# Patient Record
Sex: FEMALE
[De-identification: names, ages, dates, MRNs, and addresses within clinical notes are randomized per-mention and may not be internally consistent; named-entity substitution may affect disease eponyms.]

---

## 2018-01-31 ENCOUNTER — HOSPITAL ENCOUNTER (EMERGENCY)
Dept: HOSPITAL 31 - C.ER | Age: 28
Discharge: HOME | End: 2018-01-31
Payer: COMMERCIAL

## 2018-01-31 VITALS
SYSTOLIC BLOOD PRESSURE: 136 MMHG | RESPIRATION RATE: 20 BRPM | TEMPERATURE: 99.7 F | DIASTOLIC BLOOD PRESSURE: 80 MMHG | HEART RATE: 94 BPM | OXYGEN SATURATION: 99 %

## 2018-01-31 DIAGNOSIS — J03.90: Primary | ICD-10-CM

## 2018-01-31 NOTE — C.PDOC
History Of Present Illness


26 y/o female presents to the ER complaining of fever and throat pain which has 

been present for the past 2 days. Patient denies having nausea, vomiting, and 

diarrhea. Patient does not have any other complaints.


Time Seen by Provider: 01/31/18 09:28


Chief Complaint (Nursing): Fever


History Per: Patient


History/Exam Limitations: no limitations


Onset/Duration Of Symptoms: Days


Current Symptoms Are (Timing): Still Present


Associated Symptoms: Fever, Sore Throat


Severity: Moderate





Past Medical History


Reviewed: Historical Data, Nursing Documentation, Vital Signs


Vital Signs: 


 Last Vital Signs











Temp  99.7 F H  01/31/18 09:12


 


Pulse  94 H  01/31/18 09:12


 


Resp  20   01/31/18 09:12


 


BP  136/80   01/31/18 09:12


 


Pulse Ox  99   01/31/18 09:47














- Medical History


PMH: Asthma


Surgical History: No Surg Hx


Family History: States: No Known Family Hx





- Social History


Hx Alcohol Use: Yes


Hx Substance Use: No





- Immunization History


Hx Tetanus Toxoid Vaccination: Yes


Hx Influenza Vaccination: No


Hx Pneumococcal Vaccination: No





Review Of Systems


Except As Marked, All Systems Reviewed And Found Negative.


Constitutional: Positive for: Fever


ENT: Positive for: Throat Pain


Gastrointestinal: Negative for: Nausea, Vomiting, Diarrhea





Physical Exam





- Physical Exam


Appears: Non-toxic, No Acute Distress


Skin: Normal Color, Warm


Head: Atraumatic, Normacephalic


Eye(s): bilateral: Normal Inspection, PERRL


Ear(s): Bilateral: Normal


Nose: Normal


Oral Mucosa: Moist


Throat: No Erythema, No Exudate, Other (enlarged right tonsil area, edema in 

right tonsil which comes to midline,no abscess, no hot potato voice )


Neck: Supple


Chest: Symmetrical


Cardiovascular: Rhythm Regular


Respiratory: Normal Breath Sounds, No Accessory Muscle Use, No Rales, No Rhonchi

, No Wheezing, Other (airway is patent)


Extremity: Normal ROM


Neurological/Psych: Oriented x3, Normal Speech, Normal Cognition, Normal Motor, 

Normal Sensation





ED Course And Treatment


O2 Sat by Pulse Oximetry: 99 (RA)


Pulse Ox Interpretation: Normal


Progress Note: Patient given Augmentin, Ultram, and PredniSONE.





Medical Decision Making


Medical Decision Making: 





tonsillitis vs early R peritonsillar abscess


no hot potatoe voice


airway normal


return in AM for re-eval.





Disposition


Doctor Will See Patient In The: Office


Counseled Patient/Family Regarding: Studies Performed, Diagnosis





- Disposition


Referrals: 


[facility] [Outside]


Behin,Babak, MD [Staff Provider] - 


Disposition: HOME/ ROUTINE


Disposition Time: 09:46


Condition: GOOD


Additional Instructions: 


Augmentin dos veces al hay por 7 garcia.


Prednisona 40 mg diario por 4 garcia mas- baja inflammacion





Tramadol 50 mg (narcotico) 1-2 tabletas cada 4-6 horas carina necessario





Pepcid 20 mg (baja acides del estomago) previene irritacion del estomago debido 

al Prednisona.





Regressa manana pra re-evaluacion en el [facility] con Dr. Hendrickson





Puede seguir con Dr. Behin (otolaryngologo) especialista de las amigdalas- 

LLama para hacer neil.





Regressa immediamente si tiene difficultad respirando por la garganta. 


Prescriptions: 


Amoxicillin/Clavulanate [Augmentin 875 MG-125 MG] 1 tab PO BID #13 tab


Prednisone [Deltasone] 40 mg PO DAILY #8 tablet


traMADol [Ultram] 50 mg PO Q6H PRN #20 tab


 PRN Reason: pain 


Instructions:  Peritonsillar Abscess (ED), Tonsillitis (ED)


Forms:  [facility] (English)


Print Language: Hebrew





- Clinical Impression


Clinical Impression: 


 Tonsillitis








- Scribe Statement


The provider has reviewed the documentation as recorded by the Ehsan Salgado


Provider Attestation: 





All medical record entries made by the Dashaibe were at my direction and 

personally dictated by me. I have reviewed the chart and agree that the record 

accurately reflects my personal performance of the history, physical exam, 

medical decision making, and the department course for this patient. I have 

also personally directed, reviewed, and agree with the discharge instructions 

and disposition.

## 2018-02-01 ENCOUNTER — HOSPITAL ENCOUNTER (EMERGENCY)
Dept: HOSPITAL 31 - C.ER | Age: 28
Discharge: HOME | End: 2018-02-01
Payer: COMMERCIAL

## 2018-02-01 VITALS
TEMPERATURE: 98.9 F | DIASTOLIC BLOOD PRESSURE: 81 MMHG | HEART RATE: 95 BPM | OXYGEN SATURATION: 100 % | RESPIRATION RATE: 16 BRPM | SYSTOLIC BLOOD PRESSURE: 127 MMHG

## 2018-02-01 DIAGNOSIS — J03.90: Primary | ICD-10-CM

## 2018-02-01 NOTE — C.PDOC
History Of Present Illness


26 y/o female presents to the ER to follow up for tonsillar inflammation after 

being seen yesterday in the ER. Patient states that she was prescribed Tramadol 

and Prednisone. Patient reports that she is taking her medications with good 

compliance. Patient does not have any other complaints.


Time Seen by Provider: 02/01/18 10:22


Chief Complaint (Nursing): Wound Check


History Per: Patient


History/Exam Limitations: no limitations


Onset/Duration Of Symptoms: Days Ago


Current Symptoms Are (Timing): Still Present


Severity: Moderate





Past Medical History


Reviewed: Historical Data, Nursing Documentation, Vital Signs


Vital Signs: 


 Last Vital Signs











Temp  98.9 F   02/01/18 09:30


 


Pulse  95 H  02/01/18 09:30


 


Resp  16   02/01/18 09:30


 


BP  127/81   02/01/18 09:30


 


Pulse Ox  100   02/01/18 10:39














- Medical History


PMH: Asthma


Surgical History: No Surg Hx


Family History: States: No Known Family Hx





- Social History


Hx Alcohol Use: Yes


Hx Substance Use: No





- Immunization History


Hx Tetanus Toxoid Vaccination: Yes


Hx Influenza Vaccination: No


Hx Pneumococcal Vaccination: No





Review Of Systems


Except As Marked, All Systems Reviewed And Found Negative.


Constitutional: Negative for: Fever, Chills


ENT: Positive for: Other (tonsillar inflammation)





Physical Exam





- Physical Exam


Appears: Non-toxic, No Acute Distress


Skin: Normal Color, Warm


Head: Atraumatic, Normacephalic


Eye(s): bilateral: Normal Inspection


Nose: Normal


Oral Mucosa: Moist


Throat: Other (enlarged erythematous right tonsil, not signicantly different 

from PE on Jan 31, 2018,)


Lymphatic: Adenopathy (lymphadenopathy on right side)


Extremity: Normal ROM


Neurological/Psych: Oriented x3, Normal Speech, Normal Motor, Normal Sensation





ED Course And Treatment


O2 Sat by Pulse Oximetry: 100 (RA)


Pulse Ox Interpretation: Normal





Medical Decision Making


Medical Decision Making: 





similar exam though pt much improved symptomatically


no obvious tonsillar abscess


outpatient f/u PRN





Disposition


Doctor Will See Patient In The: Office


Counseled Patient/Family Regarding: Studies Performed, Diagnosis





- Disposition


Referrals: 


Behin,Babak, MD [Staff Provider] - 


Disposition: HOME/ ROUTINE


Disposition Time: 10:25


Condition: GOOD


Additional Instructions: 


sigue mead tratamiento carina recetado hardeep


Leonora muchos liquidos


Regressa si las sintomas se empeoran.


o' regressa con el Otolaryngologo carina necessario. 


Instructions:  Tonsillitis (ED)


Forms:  [facility] (English)


Print Language: Amharic





- Clinical Impression


Clinical Impression: 


 Tonsillitis








- Scribe Statement


The provider has reviewed the documentation as recorded by the Scribe


Patricia Salgado


Provider Attestation: 





All medical record entries made by the Scribe were at my direction and 

personally dictated by me. I have reviewed the chart and agree that the record 

accurately reflects my personal performance of the history, physical exam, 

medical decision making, and the department course for this patient. I have 

also personally directed, reviewed, and agree with the discharge instructions 

and disposition.

## 2018-02-02 ENCOUNTER — HOSPITAL ENCOUNTER (OUTPATIENT)
Dept: HOSPITAL 31 - C.ER | Age: 28
Setting detail: OBSERVATION
LOS: 1 days | Discharge: HOME | End: 2018-02-03
Attending: FAMILY MEDICINE | Admitting: FAMILY MEDICINE
Payer: COMMERCIAL

## 2018-02-02 VITALS — TEMPERATURE: 98.1 F

## 2018-02-02 DIAGNOSIS — J36: Primary | ICD-10-CM

## 2018-02-02 DIAGNOSIS — Z23: ICD-10-CM

## 2018-02-02 DIAGNOSIS — J45.909: ICD-10-CM

## 2018-02-02 LAB
BASOPHILS # BLD AUTO: 0 K/UL (ref 0–0.2)
BASOPHILS NFR BLD: 0.2 % (ref 0–2)
BUN SERPL-MCNC: 8 MG/DL (ref 7–17)
CALCIUM SERPL-MCNC: 9.4 MG/DL (ref 8.6–10.4)
EOSINOPHIL # BLD AUTO: 0 K/UL (ref 0–0.7)
EOSINOPHIL NFR BLD: 0.2 % (ref 0–4)
ERYTHROCYTE [DISTWIDTH] IN BLOOD BY AUTOMATED COUNT: 13.1 % (ref 11.5–14.5)
GFR NON-AFRICAN AMERICAN: > 60
HGB BLD-MCNC: 13.9 G/DL (ref 11–16)
LYMPHOCYTES # BLD AUTO: 1.7 K/UL (ref 1–4.3)
LYMPHOCYTES NFR BLD AUTO: 21.4 % (ref 20–40)
MCH RBC QN AUTO: 31.4 PG (ref 27–31)
MCHC RBC AUTO-ENTMCNC: 35 G/DL (ref 33–37)
MCV RBC AUTO: 89.7 FL (ref 81–99)
MONOCYTES # BLD: 0.7 K/UL (ref 0–0.8)
MONOCYTES NFR BLD: 8.1 % (ref 0–10)
NEUTROPHILS # BLD: 5.7 K/UL (ref 1.8–7)
NEUTROPHILS NFR BLD AUTO: 70.1 % (ref 50–75)
NRBC BLD AUTO-RTO: 0 % (ref 0–2)
PLATELET # BLD: 213 K/UL (ref 130–400)
PMV BLD AUTO: 9.1 FL (ref 7.2–11.7)
RBC # BLD AUTO: 4.42 MIL/UL (ref 3.8–5.2)
WBC # BLD AUTO: 8.2 K/UL (ref 4.8–10.8)

## 2018-02-02 PROCEDURE — 71045 X-RAY EXAM CHEST 1 VIEW: CPT

## 2018-02-02 PROCEDURE — 80053 COMPREHEN METABOLIC PANEL: CPT

## 2018-02-02 PROCEDURE — 96365 THER/PROPH/DIAG IV INF INIT: CPT

## 2018-02-02 PROCEDURE — 84703 CHORIONIC GONADOTROPIN ASSAY: CPT

## 2018-02-02 PROCEDURE — 96361 HYDRATE IV INFUSION ADD-ON: CPT

## 2018-02-02 PROCEDURE — 87040 BLOOD CULTURE FOR BACTERIA: CPT

## 2018-02-02 PROCEDURE — 85730 THROMBOPLASTIN TIME PARTIAL: CPT

## 2018-02-02 PROCEDURE — 80048 BASIC METABOLIC PNL TOTAL CA: CPT

## 2018-02-02 PROCEDURE — 83735 ASSAY OF MAGNESIUM: CPT

## 2018-02-02 PROCEDURE — 85025 COMPLETE CBC W/AUTO DIFF WBC: CPT

## 2018-02-02 PROCEDURE — 90674 CCIIV4 VAC NO PRSV 0.5 ML IM: CPT

## 2018-02-02 PROCEDURE — 96366 THER/PROPH/DIAG IV INF ADDON: CPT

## 2018-02-02 PROCEDURE — 90732 PPSV23 VACC 2 YRS+ SUBQ/IM: CPT

## 2018-02-02 PROCEDURE — 36415 COLL VENOUS BLD VENIPUNCTURE: CPT

## 2018-02-02 PROCEDURE — 84100 ASSAY OF PHOSPHORUS: CPT

## 2018-02-02 PROCEDURE — 96375 TX/PRO/DX INJ NEW DRUG ADDON: CPT

## 2018-02-02 PROCEDURE — 99284 EMERGENCY DEPT VISIT MOD MDM: CPT

## 2018-02-02 PROCEDURE — 85610 PROTHROMBIN TIME: CPT

## 2018-02-02 PROCEDURE — 70491 CT SOFT TISSUE NECK W/DYE: CPT

## 2018-02-02 NOTE — CT
EXAM:

  CT Neck With Intravenous Contrast



CLINICAL HISTORY:

  27 years old, female; Signs and symptoms; Mass, lump, or swelling in neck; 

Additional info: Right peritonsillar swelling R/O abscess



TECHNIQUE:

  Axial computed tomography images of the neck with intravenous contrast.  All 

CT scans at this facility use one or more dose reduction techniques, viz.: 

automated exposure control; ma/kV adjustment per patient size (including 

targeted exams where dose is matched to indication; i.e. head); or iterative 

reconstruction technique.

  Coronal and sagittal reformatted images were created and reviewed.



CONTRAST:

  100 mL of visipaque 320 administered intravenously.



COMPARISON:

  No relevant prior studies available.



FINDINGS:

  Nasopharynx:  Unremarkable.

  Oropharynx:  Enlargement of palatine tonsils, right greater than left.  2.6 x 

1.5 by 1.8 cm peripherally enhancing fluid collection within right 

peritonsillar region.  Mild stranding/edema within right parapharyngeal space.

  Hypopharynx:  Mild mucosal thickening of right hypopharynx.

  Larynx:  Unremarkable.  Normal epiglottis.

  Trachea:  Unremarkable.

  Retropharyngeal space:  Unremarkable.

  Submandibular/parotid glands:  Unremarkable.  Glands are normal in size.

  Thyroid:  Unremarkable.  No enlarged or calcified nodules.

  Bones/joints:  No acute fracture.

  Soft tissues:  Unremarkable.

  Vasculature:  No acute findings.

  Lymph nodes:  Few mildly enlarged right cervical lymph nodes.

  Sinuses:  Scattered minimal mucosal thickening.  No air-fluid levels.

  Mastoid air cells:  No mastoid effusion.

  Lung apices:  Unremarkable as visualized.



IMPRESSION:     

1.  Tonsillitis/pharyngitis with right peritonsillar abscess.

2.  Incidental/non-acute findings are described above.

## 2018-02-02 NOTE — CP.PCM.HP
<AhmadiMarcy pérezBritney - Last Filed: 02/03/18 02:36>





History of Present Illness





- History of Present Illness


History of Present Illness: 





CC: " Sore throat"





HPI: 27 year old female with past medical history of asthma comes to the ED for 

a sore throat.  She states it started on Monday and she came to the ED and they 

sent her home with Augmentin for 7 days.  She came back on Wednesday and they 

again sent her home to continue with the antibiotics and medication as needed 

for pain.  She states today she was not able to swallow her throat hurt so 

badly.  She was not able to eat or drink anything today.  She describes the 

pain as pulsating about a 9-10/10.  She states she has had fever and chills.  

She denies nausea, vomiting, diarrhea or constipation.  





PMD: None


Past Medical history: Asthma


Past Surgical history: denies


Medications: Albuterol


Allergies: NKDA


Social History: denies smoking, drinks alcohol occasionally; denies illicit 

drug use; lives with a friend at the moment; worked previously at a flower 

shop. 





Present on Admission





- Present on Admission


Any Indicators Present on Admission: No





Review of Systems





- Constitutional


Constitutional: Chills, Fever





- EENT


Eyes: absent: Change in Vision


Ears: absent: Dizziness


Nose/Mouth/Throat: Sore Throat.  absent: Nasal Congestion





- Cardiovascular


Cardiovascular: absent: Chest Pain, Dyspnea





- Respiratory


Respiratory: absent: Cough, Dyspnea





- Gastrointestinal


Gastrointestinal: absent: Constipation, Diarrhea, Nausea, Vomiting





- Genitourinary


Genitourinary: absent: Dysuria





- Neurological


Neurological: absent: Dizziness, Headaches





- Endocrine


Endocrine: absent: Fatigue, Palpitations





Past Patient History





- Infectious Disease


Hx of Infectious Diseases: None





- Past Social History


Smoking Status: Never Smoked





- PULMONARY


Hx Asthma: Yes





- PSYCHIATRIC


Hx Substance Use: No





- SURGICAL HISTORY


Hx Surgeries: No





- ANESTHESIA


Hx Anesthesia: Yes


Hx Anesthesia Reactions: No





Meds


Allergies/Adverse Reactions: 


 Allergies











Allergy/AdvReac Type Severity Reaction Status Date / Time


 


No Known Allergies Allergy   Verified 02/02/18 19:00














Physical Exam





- Constitutional


Appears: No Acute Distress





- Head Exam


Head Exam: ATRAUMATIC, NORMAL INSPECTION





- Eye Exam


Eye Exam: EOMI, Normal appearance





- ENT Exam


ENT Exam: Mucous Membranes Moist, Normal External Ear Exam, TM's Normal 

Bilaterally.  absent: Normal Exam, Normal Oropharynx (left tonsil enlarged and 

red; no exudate seen)





- Neck Exam


Neck exam: Positive for: Lymphadenopathy (cervical lymphadenopathy )





- Respiratory Exam


Respiratory Exam: Clear to Auscultation Bilateral, NORMAL BREATHING PATTERN.  

absent: Rales, Rhonchi, Wheezes





- Cardiovascular Exam


Cardiovascular Exam: REGULAR RHYTHM, RRR, +S1, +S2





- GI/Abdominal Exam


GI & Abdominal Exam: Normal Bowel Sounds, Soft.  absent: Tenderness





- Extremities Exam


Extremities exam: Positive for: normal inspection





- Neurological Exam


Neurological exam: Alert, Oriented x3





- Psychiatric Exam


Psychiatric exam: Normal Affect, Normal Mood





- Skin


Skin Exam: Normal Color, Warm





Results





- Vital Signs


Recent Vital Signs: 





 Last Vital Signs











Temp  98.1 F   02/02/18 22:14


 


Pulse  77   02/02/18 22:14


 


Resp  18   02/02/18 22:14


 


BP  100/61   02/02/18 22:14


 


Pulse Ox  98   02/02/18 22:14














- Labs


Result Diagrams: 


 02/02/18 19:35





 02/02/18 19:35


Labs: 





 Laboratory Results - last 24 hr











  02/02/18 02/02/18





  19:35 19:35


 


WBC  8.2 


 


RBC  4.42 


 


Hgb  13.9 


 


Hct  39.6 


 


MCV  89.7 


 


MCH  31.4 H 


 


MCHC  35.0 


 


RDW  13.1 


 


Plt Count  213 


 


MPV  9.1 


 


Neut % (Auto)  70.1 


 


Lymph % (Auto)  21.4 


 


Mono % (Auto)  8.1 


 


Eos % (Auto)  0.2 


 


Baso % (Auto)  0.2 


 


Neut # (Auto)  5.7 


 


Lymph # (Auto)  1.7 


 


Mono # (Auto)  0.7 


 


Eos # (Auto)  0.0 


 


Baso # (Auto)  0.0 


 


Sodium   137


 


Potassium   3.5 L


 


Chloride   95 L


 


Carbon Dioxide   31 H


 


Anion Gap   14


 


BUN   8


 


Creatinine   0.6 L


 


Est GFR ( Amer)   > 60


 


Est GFR (Non-Af Amer)   > 60


 


Random Glucose   109 H


 


Calcium   9.4














Assessment & Plan





- Assessment and Plan (Free Text)


Plan: 





1.) Right Peritonsillar abscess


- Soft Tissue Neck CT:  Tonsillitis/pharyngitis with right peritonsillar 

abscess. 


- ENT Consult: Dr. Behin --> help appreciated


- Clindamycin 600mg q6h


- Decadron 4mg q12h 


- Toradol 30mg q6 prn for pain


- NS @100cc/hr


- NPO


- Pre-op Clearance if needed


   - f/u EKG


   - f/u PT/INR


   - f/u chest xray





2.) History of Asthma


- Duonebs q6 prn





3.) Prophylaxis 


- scds


- protonix 40mg daily





Case discussed with Dr. Rosalva Ahmadi PGY-1  





<Dangelo Blackwell - Last Filed: 02/03/18 06:18>





Results





- Vital Signs


Recent Vital Signs: 





 Last Vital Signs











Temp  98.1 F   02/03/18 00:34


 


Pulse  68   02/03/18 00:34


 


Resp  20   02/03/18 00:34


 


BP  103/67   02/03/18 00:34


 


Pulse Ox  99   02/03/18 00:34














- Labs


Result Diagrams: 


 02/02/18 19:35





 02/02/18 19:35


Labs: 





 Laboratory Results - last 24 hr











  02/02/18 02/02/18





  19:35 19:35


 


WBC  8.2 


 


RBC  4.42 


 


Hgb  13.9 


 


Hct  39.6 


 


MCV  89.7 


 


MCH  31.4 H 


 


MCHC  35.0 


 


RDW  13.1 


 


Plt Count  213 


 


MPV  9.1 


 


Neut % (Auto)  70.1 


 


Lymph % (Auto)  21.4 


 


Mono % (Auto)  8.1 


 


Eos % (Auto)  0.2 


 


Baso % (Auto)  0.2 


 


Neut # (Auto)  5.7 


 


Lymph # (Auto)  1.7 


 


Mono # (Auto)  0.7 


 


Eos # (Auto)  0.0 


 


Baso # (Auto)  0.0 


 


Sodium   137


 


Potassium   3.5 L


 


Chloride   95 L


 


Carbon Dioxide   31 H


 


Anion Gap   14


 


BUN   8


 


Creatinine   0.6 L


 


Est GFR ( Amer)   > 60


 


Est GFR (Non-Af Amer)   > 60


 


Random Glucose   109 H


 


Calcium   9.4














Assessment & Plan





- Date & Time


Date: 02/03/18 (I have seen and examined the patient.  I agree with the 

findings and plan of care as documented by Dr. Ahmadi.  Patient with 

peritonsillar abscess.  Consult to ENT.  Clinda, Decadron, symptomatic treatment

, NPO.  Medically optimize prior to any procedure to be done.  Monitor for 

acute changes.)


Time: 06:17





Attending/Attestation





- Attestation


I have personally seen and examined this patient.: Yes


I have fully participated in the care of the patient.: Yes


I have reviewed all pertinent clinical information: Yes

## 2018-02-03 VITALS — SYSTOLIC BLOOD PRESSURE: 100 MMHG | OXYGEN SATURATION: 97 % | DIASTOLIC BLOOD PRESSURE: 62 MMHG | HEART RATE: 67 BPM

## 2018-02-03 VITALS — RESPIRATION RATE: 20 BRPM

## 2018-02-03 LAB
ALBUMIN SERPL-MCNC: 3.9 G/DL (ref 3.5–5)
ALBUMIN/GLOB SERPL: 1.1 {RATIO} (ref 1–2.1)
ALT SERPL-CCNC: 20 U/L (ref 9–52)
APTT BLD: 31 SECONDS (ref 21–34)
AST SERPL-CCNC: 20 U/L (ref 14–36)
BASOPHILS # BLD AUTO: 0 K/UL (ref 0–0.2)
BASOPHILS NFR BLD: 0.1 % (ref 0–2)
BUN SERPL-MCNC: 11 MG/DL (ref 7–17)
CALCIUM SERPL-MCNC: 9.2 MG/DL (ref 8.6–10.4)
EOSINOPHIL # BLD AUTO: 0 K/UL (ref 0–0.7)
EOSINOPHIL NFR BLD: 0 % (ref 0–4)
ERYTHROCYTE [DISTWIDTH] IN BLOOD BY AUTOMATED COUNT: 12.9 % (ref 11.5–14.5)
GFR NON-AFRICAN AMERICAN: > 60
HGB BLD-MCNC: 12.8 G/DL (ref 11–16)
INR PPP: 1.3
LYMPHOCYTES # BLD AUTO: 0.8 K/UL (ref 1–4.3)
LYMPHOCYTES NFR BLD AUTO: 15.3 % (ref 20–40)
MAGNESIUM SERPL-MCNC: 1.8 MG/DL (ref 1.6–2.3)
MCH RBC QN AUTO: 32.1 PG (ref 27–31)
MCHC RBC AUTO-ENTMCNC: 35.1 G/DL (ref 33–37)
MCV RBC AUTO: 91.4 FL (ref 81–99)
MONOCYTES # BLD: 0.1 K/UL (ref 0–0.8)
MONOCYTES NFR BLD: 1.5 % (ref 0–10)
NEUTROPHILS # BLD: 4.3 K/UL (ref 1.8–7)
NEUTROPHILS NFR BLD AUTO: 83.1 % (ref 50–75)
NRBC BLD AUTO-RTO: 0 % (ref 0–2)
PLATELET # BLD: 208 K/UL (ref 130–400)
PMV BLD AUTO: 10.1 FL (ref 7.2–11.7)
PROTHROMBIN TIME: 14.7 SECONDS (ref 9.7–12.2)
RBC # BLD AUTO: 3.99 MIL/UL (ref 3.8–5.2)
WBC # BLD AUTO: 5.2 K/UL (ref 4.8–10.8)

## 2018-02-03 NOTE — CP.PCM.DIS
Provider





- Provider


Date of Admission: 


02/02/18 23:13





Attending physician: 


Dangelo Blackwell MD





Primary care physician: 





none


Consults: 





Dr. Behin - ENT


Time Spent in preparation of Discharge (in minutes): 35





Diagnosis





- Discharge Diagnosis


(1) Peritonsillar abscess


Status: Acute   





Hospital Course





- Lab Results


Lab Results: 


 Most Recent Lab Values











WBC  5.2 K/uL (4.8-10.8)   02/03/18  06:39    


 


RBC  3.99 Mil/uL (3.80-5.20)   02/03/18  06:39    


 


Hgb  12.8 g/dL (11.0-16.0)   02/03/18  06:39    


 


Hct  36.5 % (34.0-47.0)   02/03/18  06:39    


 


MCV  91.4 fL (81.0-99.0)   02/03/18  06:39    


 


MCH  32.1 pg (27.0-31.0)  H  02/03/18  06:39    


 


MCHC  35.1 g/dL (33.0-37.0)   02/03/18  06:39    


 


RDW  12.9 % (11.5-14.5)   02/03/18  06:39    


 


Plt Count  208 K/uL (130-400)   02/03/18  06:39    


 


MPV  10.1 fL (7.2-11.7)   02/03/18  06:39    


 


Neut % (Auto)  83.1 % (50.0-75.0)  H  02/03/18  06:39    


 


Lymph % (Auto)  15.3 % (20.0-40.0)  L  02/03/18  06:39    


 


Mono % (Auto)  1.5 % (0.0-10.0)   02/03/18  06:39    


 


Eos % (Auto)  0.0 % (0.0-4.0)   02/03/18  06:39    


 


Baso % (Auto)  0.1 % (0.0-2.0)   02/03/18  06:39    


 


Neut # (Auto)  4.3 K/uL (1.8-7.0)   02/03/18  06:39    


 


Lymph # (Auto)  0.8 K/uL (1.0-4.3)  L  02/03/18  06:39    


 


Mono # (Auto)  0.1 K/uL (0.0-0.8)   02/03/18  06:39    


 


Eos # (Auto)  0.0 K/uL (0.0-0.7)   02/03/18  06:39    


 


Baso # (Auto)  0.0 K/uL (0.0-0.2)   02/03/18  06:39    


 


PT  14.7 SECONDS (9.7-12.2)  H  02/03/18  06:39    


 


INR  1.3   02/03/18  06:39    


 


APTT  31 SECONDS (21-34)   02/03/18  06:39    


 


Sodium  137 mmol/L (132-148)   02/03/18  06:39    


 


Potassium  4.4 mmol/L (3.6-5.2)   02/03/18  06:39    


 


Chloride  101 mmol/L ()   02/03/18  06:39    


 


Carbon Dioxide  26 mmol/L (22-30)   02/03/18  06:39    


 


Anion Gap  15  (10-20)   02/03/18  06:39    


 


BUN  11 mg/dL (7-17)   02/03/18  06:39    


 


Creatinine  0.6 mg/dL (0.7-1.2)  L  02/03/18  06:39    


 


Est GFR ( Amer)  > 60   02/03/18  06:39    


 


Est GFR (Non-Af Amer)  > 60   02/03/18  06:39    


 


Random Glucose  125 mg/dL ()  H  02/03/18  06:39    


 


Calcium  9.2 mg/dl (8.6-10.4)   02/03/18  06:39    


 


Phosphorus  3.9 mg/dL (2.5-4.5)   02/03/18  06:39    


 


Magnesium  1.8 mg/dL (1.6-2.3)   02/03/18  06:39    


 


Total Bilirubin  0.6 mg/dL (0.2-1.3)   02/03/18  06:39    


 


AST  20 U/L (14-36)   02/03/18  06:39    


 


ALT  20 U/L (9-52)   02/03/18  06:39    


 


Alkaline Phosphatase  76 U/L ()   02/03/18  06:39    


 


Total Protein  7.5 g/dL (6.3-8.3)   02/03/18  06:39    


 


Albumin  3.9 g/dL (3.5-5.0)   02/03/18  06:39    


 


Globulin  3.7 gm/dL (2.2-3.9)   02/03/18  06:39    


 


Albumin/Globulin Ratio  1.1  (1.0-2.1)   02/03/18  06:39    


 


Urine HCG, Qual  Negative  (NEGATIVE)   02/03/18  09:16    














- Hospital Course


Hospital Course: 





PMD: None


Past Medical history: Asthma


Past Surgical history: denies


Medications: Albuterol


Allergies: NKDA


Social History: denies smoking, drinks alcohol occasionally; denies illicit 

drug use; lives with a friend at the moment; worked previously at a flower shop.








On admission: 27 year old female with past medical history of asthma comes to 

the ED for a sore throat.  She states it started on Monday and she came to the 

ED and they sent her home with Augmentin for 7 days.  She came back on 

Wednesday and they again sent her home to continue with the antibiotics and 

medication as needed for pain.  She states today she was not able to swallow 

her throat hurt so badly.  She was not able to eat or drink anything today.  

She describes the pain as pulsating about a 9-10/10.  She states she has had 

fever and chills.  She denies nausea, vomiting, diarrhea or constipation. 





During hospital stay: Soft Tissue Neck CT showed Tonsillitis/pharyngitis with 

right peritonsillar abscess. Dr. Behin was consulted and performed and I/D of 

this abscess on 2/3. Patient was given toradol for pain control. She was given 

Clindamycin during her stay. She was also given decadron to reduce swelling. 


 


Patient is stable for discharge home today. She is to follow up with her 

primary care within one week of discharge for post hospital care. She is also 

to call and make an appointment to follow up with Dr. Behin for the abscess in 

her throat within one week. She is to continue taking the Augmentin (antibiotic

) for 7 more days. She has 3 days left and we will write for 4 more days which 

she will need to  from the pharmacy. She is also to take a probiotic 

along with the antibiotic which she can buy over the counter. Patient is to 

continue liquid diet today then switch to softer foods tomorrow. She is to 

return to the ER if she can't tolerate oral intake, or if she develops fever/

chills. All instructions explained to the patient and she agrees. 





Discharge Exam





- Head Exam


Head Exam: ATRAUMATIC, NORMAL INSPECTION





- Eye Exam


Eye Exam: EOMI


Pupil Exam: NORMAL ACCOMODATION





- ENT Exam


Additional comments: 





tenderness R neck region





- Respiratory Exam


Respiratory Exam: Clear to PA & Lateral, NORMAL BREATHING PATTERN.  absent: 

Respiratory Distress





- Cardiovascular Exam


Cardiovascular Exam: REGULAR RHYTHM, +S1, +S2





- GI/Abdominal Exam


GI & Abdominal Exam: Normal Bowel Sounds, Soft.  absent: Distended, Firm, 

Guarding





- Extremities Exam


Extremities exam: normal inspection





- Back Exam


Back exam: NORMAL INSPECTION





- Neurological Exam


Neurological exam: Alert, CN II-XII Intact, Normal Gait, Oriented x3





- Psychiatric Exam


Psychiatric exam: Normal Affect, Normal Mood





- Skin


Skin Exam: Dry, Intact, Normal Color





Discharge Plan





- Discharge Medications


Prescriptions: 


Amoxicillin/Clavulanate [Augmentin 875 MG-125 MG Tab] 1 tab PO BID #8 tab





- Follow Up Plan


Condition: GOOD


Disposition: HOME/ ROUTINE


Instructions:  Amoxicillin/Clavulanate Potassium (By mouth), Abscess (GEN), 

Incision and Drainage (DC)


Additional Instructions: 


Patient is stable for discharge home. She is to follow up with her primary care 

within one week of discharge for post hospital care. She is also to call and 

make an appointment to follow up with Dr. Behin for the abscess in her throat 

within one week. She is to continue taking the Augmentin (antibiotic) for 7 

more days. She has 3 days left and we will write for 4 more days which she will 

need to  from the pharmacy. She is also to take a probiotic along with 

the antibiotic which she can buy over the counter. Patient is to continue 

liquid diet today then switch to softer foods tomorrow. She is to return to the 

ER if she can't tolerate oral intake, or if she develops fever/chills. All 

instructions explained to the patient and she agrees. 








El paciente est estable para el [place]ott domiciliaria. Abby debe realizar un 

seguimiento con mead atencin primaria dentro de belinda semana del [place] para recibir 

atencin hospitalaria posterior. Abby tambin debe llamar y hacer belinda neil para 

hacer un seguimiento con el Dr. Behin para el absceso en la garganta dentro de 

belinda semana. Abby debe continuar tomando Augmentin (antibitico) por 7 alvarado ms. 

Le quedan 3 alvarado y le escribiremos kai 4 alvarado ms, que deber retirar de la 

farmacia. Abby tambin debe faby un probitico junto con el antibitico que 

puede comprar sin receta mdica. El paciente debe continuar la dieta lquida 

hoy y luego cambiar a alimentos ms blandos maana. Abby debe regresar a la 

caryn de emergencias si no puede tolerar la ingesta oral o si desarrolla fiebre 

/ escalofros. Todas las instrucciones se explican a la paciente y abby est de 

acuerdo.


Referrals: 


[facility] [Outside]


Behin,Babak, MD [Staff Provider] -

## 2018-02-03 NOTE — OP
PROCEDURE DATE:  02/03/2018



PREOPERATIVE DIAGNOSIS:  Right peritonsillar abscess.



POSTOPERATIVE DIAGNOSIS:  Right peritonsillar abscess.



PROCEDURE:  Incision and drainage of right peritonsillar abscess.



SIGNIFICANT FINDINGS:  Right peritonsillar abscess.



DESCRIPTION OF PROCEDURE:  The patient was placed in the supine position. 

The right peritonsillar area was injected with lidocaine with epinephrine. 

A #11 blade was used to make an incision in the right peritonsillar area. 

Pus was noted in the right peritonsillar area.  _____ dissection was done,

loculations were broken.  Bleeding was controlled with time.  The patient

tolerated the procedure well.







__________________________________________

Babak Behin, MD





DD:  02/03/2018 8:43:29

DT:  02/03/2018 11:07:53

Job # [id number]

## 2018-02-03 NOTE — RAD
HISTORY:

pre op  



COMPARISON:

No prior. 



FINDINGS:



LUNGS:

No focal consolidation. The bronchovascular markings appear increased 

which may in part be due to due to semi-erect patient positioning and 

possibly diminished lung volumes.



PLEURA:

No significant pleural effusion identified, no pneumothorax apparent.



CARDIOVASCULAR:

Normal.



OSSEOUS STRUCTURES:

No significant abnormalities.



VISUALIZED UPPER ABDOMEN:

Normal.



OTHER FINDINGS:

None.



IMPRESSION:





No focal consolidation. The bronchovascular markings appear increased 

which may in part be due to due to semi-erect patient positioning and 

possibly diminished lung volumes.

## 2018-12-01 ENCOUNTER — HOSPITAL ENCOUNTER (EMERGENCY)
Dept: HOSPITAL 31 - C.ER | Age: 28
Discharge: HOME | End: 2018-12-01
Payer: COMMERCIAL

## 2018-12-01 VITALS
DIASTOLIC BLOOD PRESSURE: 70 MMHG | RESPIRATION RATE: 14 BRPM | HEART RATE: 90 BPM | SYSTOLIC BLOOD PRESSURE: 110 MMHG | TEMPERATURE: 98.7 F

## 2018-12-01 VITALS — OXYGEN SATURATION: 99 %

## 2018-12-01 DIAGNOSIS — O20.0: Primary | ICD-10-CM

## 2018-12-01 DIAGNOSIS — Z3A.01: ICD-10-CM

## 2018-12-01 LAB
ALBUMIN SERPL-MCNC: 4.5 [, G/DL] (ref 3.5–5)
ALBUMIN/GLOB SERPL: 1.5 [,] (ref 1–2.1)
ALT SERPL-CCNC: 50 [, U/L] (ref 9–52)
APTT BLD: 34 [, SECONDS] (ref 21–34)
AST SERPL-CCNC: 35 [, U/L] (ref 14–36)
BASOPHILS # BLD AUTO: 0 [, K/UL] (ref 0–0.2)
BASOPHILS NFR BLD: 0.2 [, %] (ref 0–2)
BUN SERPL-MCNC: 5 [, MG/DL] (ref 7–17)
CALCIUM SERPL-MCNC: 9.3 [, MG/DL] (ref 8.6–10.4)
EOSINOPHIL # BLD AUTO: 0.5 [, K/UL] (ref 0–0.7)
EOSINOPHIL NFR BLD: 6.6 [, %] (ref 0–4)
ERYTHROCYTE [DISTWIDTH] IN BLOOD BY AUTOMATED COUNT: 13.4 [, %] (ref 11.5–14.5)
GFR NON-AFRICAN AMERICAN: > 60 [,]
HGB BLD-MCNC: 12.6 [, G/DL] (ref 11–16)
INR PPP: 1.1 [,]
LYMPHOCYTES # BLD AUTO: 2.3 [, K/UL] (ref 1–4.3)
LYMPHOCYTES NFR BLD AUTO: 33.8 [, %] (ref 20–40)
MCH RBC QN AUTO: 29.9 [, PG] (ref 27–31)
MCHC RBC AUTO-ENTMCNC: 33.5 [, G/DL] (ref 33–37)
MCV RBC AUTO: 89.2 [, FL] (ref 81–99)
MONOCYTES # BLD: 0.5 [, K/UL] (ref 0–0.8)
MONOCYTES NFR BLD: 7.8 [, %] (ref 0–10)
NEUTROPHILS # BLD: 3.6 [, K/UL] (ref 1.8–7)
NEUTROPHILS NFR BLD AUTO: 51.6 [, %] (ref 50–75)
NRBC BLD AUTO-RTO: 0.1 [, %] (ref 0–2)
PLATELET # BLD: 175 [, K/UL] (ref 130–400)
PMV BLD AUTO: 9.6 [, FL] (ref 7.2–11.7)
PROTHROMBIN TIME: 11.8 [, SECONDS] (ref 9.7–12.2)
RBC # BLD AUTO: 4.23 [, MIL/UL] (ref 3.8–5.2)
WBC # BLD AUTO: 6.9 [, K/UL] (ref 4.8–10.8)

## 2018-12-01 PROCEDURE — 85025 COMPLETE CBC W/AUTO DIFF WBC: CPT

## 2018-12-01 PROCEDURE — 86850 RBC ANTIBODY SCREEN: CPT

## 2018-12-01 PROCEDURE — 84703 CHORIONIC GONADOTROPIN ASSAY: CPT

## 2018-12-01 PROCEDURE — 76805 OB US >/= 14 WKS SNGL FETUS: CPT

## 2018-12-01 PROCEDURE — 85610 PROTHROMBIN TIME: CPT

## 2018-12-01 PROCEDURE — 76705 ECHO EXAM OF ABDOMEN: CPT

## 2018-12-01 PROCEDURE — 99284 EMERGENCY DEPT VISIT MOD MDM: CPT

## 2018-12-01 PROCEDURE — 86900 BLOOD TYPING SEROLOGIC ABO: CPT

## 2018-12-01 PROCEDURE — 80053 COMPREHEN METABOLIC PANEL: CPT

## 2018-12-01 PROCEDURE — 85730 THROMBOPLASTIN TIME PARTIAL: CPT

## 2018-12-01 PROCEDURE — 76817 TRANSVAGINAL US OBSTETRIC: CPT

## 2018-12-01 PROCEDURE — 84702 CHORIONIC GONADOTROPIN TEST: CPT

## 2018-12-01 NOTE — C.PDOC
History Of Present Illness


28 year old female presents to the ED c/o hypogastric abd vaginal bleeding that 

started today. Patient states her LMP was on September, patient reports she is 

pregnant. Patient also c/o mild headache. Patient denies fever, chills, nausea, 

vomit, diarrhea, rash, back pain, dysuria, hematuria, weakness, numbness.


Chief Complaint (Nursing): Female Genitourinary


History Per: Patient


History/Exam Limitations: no limitations


Onset/Duration Of Symptoms: Hrs


Current Symptoms Are (Timing): Still Present


Quality Of Discomfort: "Pain"


Associated Symptoms: denies: Nausea, Vomiting, Diarrhea, Urinary Symptoms


Recent travel outside of the [place]: No


Additional History Per: Patient


Abnormal Vaginal Bleeding: Yes


Last Menstral Period: September





Past Medical History


Reviewed: Historical Data, Nursing Documentation, Vital Signs


Vital Signs: 





                                Last Vital Signs











Temp  98.2 F   18 20:21


 


Pulse  85   18 20:21


 


Resp  16   18 20:21


 


BP  124/79   18 20:21


 


Pulse Ox  99   18 20:21














- Medical History


PMH: Asthma


   Denies: Chronic Kidney Disease


Surgical History: No Surg Hx


Family History: States: Unknown Family Hx





- Social History


Hx Tobacco Use: No


Hx Alcohol Use: No


Hx Substance Use: No





- Immunization History


Hx Tetanus Toxoid Vaccination: Yes


Hx Influenza Vaccination: No


Hx Pneumococcal Vaccination: No





Review Of Systems


Constitutional: Negative for: Fever, Chills


Cardiovascular: Negative for: Chest Pain


Respiratory: Negative for: Cough, Shortness of Breath


Gastrointestinal: Positive for: Abdominal Pain.  Negative for: Nausea, Vomiting,

Diarrhea


Genitourinary: Positive for: Vaginal Bleeding.  Negative for: Dysuria, Vaginal 

Discharge


Musculoskeletal: Negative for: Back Pain


Neurological: Positive for: Headache.  Negative for: Weakness, Numbness, 

Dizziness





Physical Exam





- Physical Exam


Appears: Non-toxic, No Acute Distress


Skin: Normal Color, Warm, Dry


Head: Atraumatic, Normacephalic


Eye(s): bilateral: Normal Inspection


Neck: Normal ROM, Supple


Chest: Symmetrical


Cardiovascular: Rhythm Regular


Respiratory: Normal Breath Sounds, No Rales, No Rhonchi, No Wheezing


Gastrointestinal/Abdominal: Soft, Tenderness (mild hypogastric, RLQ), No 

Guarding, No Rebound


Pelvic: No Cervical Motion Tenderness, No Cervix Open (closed), No Adnexal 

Tenderness, Other (minimal bllod in the vault, no active bleeding)


Extremity: Normal ROM, No Tenderness, No Swelling


Neurological/Psych: Oriented x3, Normal Speech, Normal Cognition


Gait: Steady





ED Course And Treatment





- Laboratory Results


Result Diagrams: 


                                 18 20:54





                                 18 20:54


O2 Sat by Pulse Oximetry: 99 (ON RA)


Pulse Ox Interpretation: Normal





- CT Scan/US


  ** Abdomen US


Other Rad Studies (CT/US): Read By Radiologist, Radiology Report Reviewed


CT/US Interpretation: EXAM:  US Abdomen, Right lower Quadrant.  CLINICAL 

HISTORY:  Rlq tenderness.  TECHNIQUE:  Multiple images of the right lower 

quadrant were acquired using grayscale and color flow imaging.  COMPARISON:  

None provided.  FINDINGS:  Reason for study: Right lower quadrant pain, rule out

appendicitis.  Bowel peristalsis was identified. The appendix is not identified 

with certainty.  IMPRESSION:  Bowel peristalsis was identified. The appendix is 

not identified with certainty.  .  Electronically signed on Dec 1, 2018 10:29:40

PM EST by:  Aubrey Abernathy M.D., Certified by [facility]





  ** Pelvic US


Other Rad Studies (CT/US): Read By Radiologist, Radiology Report Reviewed


CT/US Interpretation: CLINICAL HISTORY:  Pregnant/bleeding.  The last menstrual 

period was on 09/15/2018.  The patient is G3, P2. Beta HCG is pending.  

TECHNIQUE:  Realtime sonographic images were obtained in multiple projections.  

Color Doppler imaging was obtained.  COMMENTS:  A single intrauterine pregnancy 

is identified.  Fetal heart motion is not detected.  No yolk sac is identified. 

No fetal pole identified.  Mean gestational sac diameter is 2.04 cm, 6 week 4 

days.  The uterus is anteverted measuring 10.4 x 5.3 x 7.8 cm.  The cervical 

length is 3.6 cm.  There is no evidence of free fluid within the pelvic 

cul-de-sac.  The right ovary measures 3 x 2.2 x 2.2 cm and the left ovary 

measures 3.2 x 2 x 3.2 cm.  Both ovaries are free of solid or cystic mass.  

Blood flow is demonstrated within both ovaries.  Small subchorionic hemorrhage 

is measuring 1.9 x 0.8 x 1.2 cm.  IMPRESSION:  1.  Single intrauterine 

gestation, 6 weeks 4 days.  No yolk sac or fetal pole identified. Fetal 

demise/missed  is suspected. Please correlate with beta HCG.  2.  Small 

subchorionic hemorrhage.  .  Electronically signed on Dec 1, 2018 10:48:45 PM 

EST by:  Gary Florian M.D., GUILLERMO Certified By [facility] & [facility].  Fellowship Trained 

MRI and CT Specialist.  





Medical Decision Making


Medical Decision Making: 


Plan:


* Labs


* IV fluids


* UA


* Pelvic US





Disposition


Counseled Patient/Family Regarding: Diagnosis





- Disposition


Referrals: 


[facility] [Outside]


Disposition: HOME/ ROUTINE


Disposition Time: 22:54


Condition: STABLE


Instructions:  Threatened Miscarriage (DC), Bleeding With Pregnancy (DC)


Forms:  [employer] (English), Gen Discharge Inst Paraguayan


Print Language: Egyptian





- POA


Present On Arrival: None





- Clinical Impression


Clinical Impression: 


 Threatened  in early pregnancy








- Scribe Statement


The provider has reviewed the documentation as recorded by the Scribe


Romain Arango





All medical record entries made by the Scribe were at my direction and pe

rsonally dictated by me. I have reviewed the chart and agree that the record 

accurately reflects my personal performance of the history, physical exam, 

medical decision making, and the department course for this patient. I have also

 personally directed, reviewed, and agree with the discharge instructions and 

disposition.

## 2018-12-02 NOTE — US
Limited right lower quadrant abdominal ultrasound 



HISTORY:

Right lower quadrant abdominal tenderness. 



COMPARISON:

None available. 



Technique: Real-time sonography was performed through the right lower 

quadrant of the abdomen. 



Findings: 



Limited sonographic evaluation through the right lower quadrant. 



Appendix not well visualized.  



Impression: 



Appendix not well visualized on this limited right lower quadrant 

abdominal ultrasound.  Underlying acute appendicitis cannot be 

excluded on the basis of these images.  Clinical correlation. 



A preliminary report was generated at 10:29 p.m. on 12/01/2018 by Dr. Aubrey Abernathy from [facility].

## 2018-12-02 NOTE — US
Pelvic ultrasound 



HISTORY:

Pregnancy.  Bleeding. 



COMPARISON:

None available. 



Technique: Real-time sonography was performed through the pelvis 

utilizing transabdominal and transvaginal techniques. 



Findings: 



Uterus: 10.4 x 5.3 x 7.8 centimeters.  Heterogeneous echotexture.  

Anteverted. 



Cervix measures 3.6 centimeters. 



Intrauterine gestational sac measuring 2.0 centimeters corresponding 

to a gestational age of 6 weeks and 4 days.  No yolk sac or fetal 

pole identified. 



No free fluid in the pelvic cul-de-sac. 



Suggestion of adjacent subchorionic hemorrhage measuring up to 1.9 x 

0.8 x 1.2 centimeters. 



Right ovary: 3.0 x 2.2 x 2.2 centimeters.  Normal flow. 



Left ovary: 3.2 x 2.0 x 3.2 centimeters.  Normal flow. 



LMP of 09/15/2018. 



Estimated gestational age by LMP of 11 weeks and 0 days 



Impression:



1. Intrauterine gestational sac measuring 2.0 centimeters 

corresponding to a gestational age of 6 weeks and 4 days.  No 

discrete yolk sac or fetal pole identified.   Clinical correlation. 



2. Small amount of subchorionic hemorrhage adjacent to the 

gestational sac measuring up to 1.9 centimeters. 



Limited 1st trimester ultrasound for viability purposes only.  

Continued interval followup with serial ultrasound, serial HCG levels,

 and gynecological consultation would be helpful if clinically 

indicated. 



A preliminary report was generated at 10:48 p.m. on 12/01/2018 by Dr. Gary Florian from [facility].

## 2018-12-07 ENCOUNTER — HOSPITAL ENCOUNTER (INPATIENT)
Dept: HOSPITAL 31 - C.ER | Age: 28
Discharge: HOME | DRG: 544 | End: 2018-12-07
Attending: OBSTETRICS & GYNECOLOGY | Admitting: OBSTETRICS & GYNECOLOGY
Payer: COMMERCIAL

## 2018-12-07 VITALS
TEMPERATURE: 98.4 F | HEART RATE: 70 BPM | OXYGEN SATURATION: 99 % | DIASTOLIC BLOOD PRESSURE: 58 MMHG | SYSTOLIC BLOOD PRESSURE: 104 MMHG

## 2018-12-07 VITALS — RESPIRATION RATE: 18 BRPM

## 2018-12-07 DIAGNOSIS — J45.909: ICD-10-CM

## 2018-12-07 DIAGNOSIS — N85.4: ICD-10-CM

## 2018-12-07 DIAGNOSIS — O03.6: Primary | ICD-10-CM

## 2018-12-07 LAB
ALBUMIN SERPL-MCNC: 4.4 G/DL (ref 3.5–5)
ALBUMIN/GLOB SERPL: 1.5 {RATIO} (ref 1–2.1)
ALT SERPL-CCNC: 82 U/L (ref 9–52)
APTT BLD: 28 SECONDS (ref 21–34)
AST SERPL-CCNC: 46 U/L (ref 14–36)
BASOPHILS # BLD AUTO: 0 K/UL (ref 0–0.2)
BASOPHILS # BLD AUTO: 0 K/UL (ref 0–0.2)
BASOPHILS NFR BLD: 0.3 % (ref 0–2)
BASOPHILS NFR BLD: 0.3 % (ref 0–2)
BUN SERPL-MCNC: 8 MG/DL (ref 7–17)
CALCIUM SERPL-MCNC: 9 MG/DL (ref 8.6–10.4)
EOSINOPHIL # BLD AUTO: 0.2 K/UL (ref 0–0.7)
EOSINOPHIL # BLD AUTO: 0.3 K/UL (ref 0–0.7)
EOSINOPHIL NFR BLD: 2.3 % (ref 0–4)
EOSINOPHIL NFR BLD: 3.4 % (ref 0–4)
ERYTHROCYTE [DISTWIDTH] IN BLOOD BY AUTOMATED COUNT: 13.4 % (ref 11.5–14.5)
ERYTHROCYTE [DISTWIDTH] IN BLOOD BY AUTOMATED COUNT: 13.6 % (ref 11.5–14.5)
GFR NON-AFRICAN AMERICAN: > 60
HGB BLD-MCNC: 12.2 G/DL (ref 11–16)
HGB BLD-MCNC: 9 G/DL (ref 11–16)
INR PPP: 1.2
LYMPHOCYTES # BLD AUTO: 2.3 K/UL (ref 1–4.3)
LYMPHOCYTES # BLD AUTO: 3.2 K/UL (ref 1–4.3)
LYMPHOCYTES NFR BLD AUTO: 28.8 % (ref 20–40)
LYMPHOCYTES NFR BLD AUTO: 35 % (ref 20–40)
MCH RBC QN AUTO: 31.1 PG (ref 27–31)
MCH RBC QN AUTO: 31.2 PG (ref 27–31)
MCHC RBC AUTO-ENTMCNC: 34.1 G/DL (ref 33–37)
MCHC RBC AUTO-ENTMCNC: 34.2 G/DL (ref 33–37)
MCV RBC AUTO: 91.1 FL (ref 81–99)
MCV RBC AUTO: 91.2 FL (ref 81–99)
MONOCYTES # BLD: 0.4 K/UL (ref 0–0.8)
MONOCYTES # BLD: 0.5 K/UL (ref 0–0.8)
MONOCYTES NFR BLD: 4.9 % (ref 0–10)
MONOCYTES NFR BLD: 5.7 % (ref 0–10)
NEUTROPHILS # BLD: 5 K/UL (ref 1.8–7)
NEUTROPHILS # BLD: 5.2 K/UL (ref 1.8–7)
NEUTROPHILS NFR BLD AUTO: 55.6 % (ref 50–75)
NEUTROPHILS NFR BLD AUTO: 63.7 % (ref 50–75)
NRBC BLD AUTO-RTO: 0 % (ref 0–2)
NRBC BLD AUTO-RTO: 0 % (ref 0–2)
PLATELET # BLD: 153 K/UL (ref 130–400)
PLATELET # BLD: 206 K/UL (ref 130–400)
PMV BLD AUTO: 9.8 FL (ref 7.2–11.7)
PMV BLD AUTO: 9.9 FL (ref 7.2–11.7)
PROTHROMBIN TIME: 12.8 SECONDS (ref 9.7–12.2)
RBC # BLD AUTO: 2.9 MIL/UL (ref 3.8–5.2)
RBC # BLD AUTO: 3.91 MIL/UL (ref 3.8–5.2)
WBC # BLD AUTO: 7.9 K/UL (ref 4.8–10.8)
WBC # BLD AUTO: 9.3 K/UL (ref 4.8–10.8)

## 2018-12-07 PROCEDURE — 10D17Z9 MANUAL EXTRACTION OF PRODUCTS OF CONCEPTION, RETAINED, VIA NATURAL OR ARTIFICIAL OPENING: ICD-10-PCS | Performed by: OBSTETRICS & GYNECOLOGY

## 2018-12-07 NOTE — PCM.SURG1
Surgeon's Initial Post Op Note





- Surgeon's Notes


Surgeon: Trisha Leos MD


Assistant: Not applicable


Type of Anesthesia: General LMA


Anesthesia Administered By: Aubrey Sales MD


Pre-Operative Diagnosis: Incomplete ; hemorrhage


Operative Findings: Uterus sounded to 12 cm. Cervical os 1 cm. Uterus 12 weeks 

anteverted, no adnexal masses. Moderate amount of products of conception.


Post-Operative Diagnosis: Same


Operation Performed: Suction/sharp curettage


Specimen/Specimens Removed: Products of conception


Estimated Blood Loss: EBL {In ML}: 50 (IVFs 1,000mL LR)


Blood Products Given: N/A


Drains Used: No Drains


Post-Op Condition: Good


Date of Surgery/Procedure: 18


Time of Surgery/Procedure: 04:27

## 2018-12-07 NOTE — CP.PCM.PN
<RamonBobby Virgil - Last Filed: 18 08:36>





Subjective





- Date & Time of Evaluation


Date of Evaluation: 18


Time of Evaluation: 08:21





- Subjective


Subjective: 





Pt is a 27yo  female with a PMH of asthma who presents with vaginal 

bleeding and abdominal pain. Today she reports improved vaginal bleeding of dark

red blood and improved 6/10 abdominal pain which she rates at a 6/10 and comes 

and goes. Pt denies nausea and vomiting.





Objective





- Vital Signs/Intake and Output


Vital Signs (last 24 hours): 


                                        











Temp Pulse Resp BP Pulse Ox


 


 98.1 F   71   18   102/66   71 L


 


 18 06:00  18 06:00  18 06:00  18 06:00  18 06:00








Intake and Output: 


                                        











 18





 06:59 18:59


 


Intake Total 1125 


 


Output Total 500 


 


Balance 625 














- Medications


Medications: 


                               Current Medications





Hydromorphone HCl (Dilaudid)  0.5 mg IVP Q15M PRN


   PRN Reason: Pain, severe (8-10)


Doxycycline Hyclate 100 mg/ (Sodium Chloride)  100 mls @ 100 mls/hr IVPB Q12H SC

H; Protocol


   Last Admin: 18 03:38 Dose:  100 mls/hr


Oxytocin (Pitocin 20 Units In Lr)  1,000 mls @ 125 mls/hr IV .Q8H KENY; Protocol


   Last Admin: 18 06:13 Dose:  125 mls/hr


Ibuprofen (Motrin Tab)  600 mg PO Q6 PRN


   PRN Reason: pain, moderate











- Labs


Labs: 


                                        





                                 18 01:42 





                                 18 01:42 





                                        











PT  12.8 SECONDS (9.7-12.2)  H  18  03:20    


 


INR  1.2   18  03:20    


 


APTT  28 SECONDS (21-34)   18  03:20    














- Constitutional


Appears: No Acute Distress





- Head Exam


Head Exam: ATRAUMATIC, NORMOCEPHALIC





- Eye Exam


Eye Exam: EOMI





- ENT Exam


ENT Exam: Mucous Membranes Moist





- Neck Exam


Neck Exam: Full ROM





- Respiratory Exam


Respiratory Exam: Clear to Ausculation Bilateral, NORMAL BREATHING PATTERN.  

absent: Accessory Muscle Use, Wheezes, Respiratory Distress, Stridor





- Cardiovascular Exam


Cardiovascular Exam: RRR, +S1, +S2.  absent: Diastolic murmur, JVD





- GI/Abdominal Exam


GI & Abdominal Exam: Soft, Normal Bowel Sounds





- Extremities Exam


Extremities Exam: Full ROM.  absent: Pedal Edema, Tenderness





- Neurological Exam


Neurological Exam: Alert, Awake, Oriented x3





- Psychiatric Exam


Psychiatric exam: Normal Affect, Normal Mood





- Skin


Skin Exam: Dry, Intact, Warm





Assessment and Plan





- Assessment and Plan (Free Text)


Assessment: 





27yo  female with a PMH of asthma who presents with vaginal bleeding and

abdominal pain. Pt received a D/C last night. 


Plan: 





Status post dilation and curettage


- pt reports bleeding is improving


- pain is improving


- continue motrin for pain control


- continue doxycycline 


- regular diet





Pt seen, examined, assessment and plan discussed with Dr Rolando Navarro PGY1, Internal Medicine Resident 





<Geraldine Whiting - Last Filed: 18 14:12>





Objective





- Vital Signs/Intake and Output


Vital Signs (last 24 hours): 


                                        











Temp Pulse Resp BP Pulse Ox


 


 98.4 F   70   18   104/58 L  99 


 


 18 07:30  18 07:30  18 07:30  18 07:30  18 07:30








Intake and Output: 


                                        











 18





 06:59 18:59


 


Intake Total 1125 320


 


Output Total 500 


 


Balance 625 320














- Medications


Medications: 


                               Current Medications





Hydromorphone HCl (Dilaudid)  0.5 mg IVP Q15M PRN


   PRN Reason: Pain, severe (8-10)


Doxycycline Hyclate 100 mg/ (Sodium Chloride)  100 mls @ 100 mls/hr IVPB Q12H 

KENY; Protocol


   Last Admin: 18 03:38 Dose:  100 mls/hr


Oxytocin (Pitocin 20 Units In Lr)  1,000 mls @ 125 mls/hr IV .Q8H KENY; Protocol


   Last Admin: 18 06:13 Dose:  125 mls/hr


Ibuprofen (Motrin Tab)  600 mg PO Q6 PRN


   PRN Reason: pain, moderate


   Last Admin: 18 10:22 Dose:  600 mg











- Labs


Labs: 


                                        





                                 18 11:08 





                                 18 01:42 





                                        











PT  12.8 SECONDS (9.7-12.2)  H  18  03:20    


 


INR  1.2   18  03:20    


 


APTT  28 SECONDS (21-34)   18  03:20    














Assessment and Plan





- Assessment and Plan (Free Text)


Plan: 





Addendum: 


27yo F s/p D&C for Incomplete  Ebl: 500cc 


stable and afebrile 


Hb 12.2-->9, pt denies chest pain, SOB, dizziness 


bleeding WNL 


DC home with follow up in [facility] [phone] in 2 weeks for follow

up visit 





NICOLA Whiting D.O.

## 2018-12-07 NOTE — CP.PCM.HP
History of Present Illness





- History of Present Illness


History of Present Illness: 





Patient is [place]-speaking. GIOVANA  ID [id number], Jourdan





28 y.o. , LMP 9/15/18, ERIC 19, EGA 12 weeks presented to [facility]. c/o 

onset of heavy vaginal bleeding at 2200 hours 18 and strong abdominal pain,

"... like contractions when having a baby" onset 2300 hours. In [facility] 

quantitative HCG noted to have decreased from 13,362 on , now 5,966.6. 

Pelvic ultrasound 18 - (+) gestational sac c/w 6w 4d; no yolk sac; no fetal

pole; small subchorionic hemorrhage noted. Normal left and right ovaries 

bilaterally. Not yet initiated prenatal care.


Patient received in stretcher cubicle #10, in apparent pain - S/P morphine; 

 at bedside. Patient awake, alert, oriented to time person and place. 

Lasat ate 1800 hours, 18.





P Ob:  x 2, both in [facility], between 8 1/2 and 9 lb; 2011, male, , 

female.


P GYN: 15 x monthly x 5-8. Denies h/o STIs, abnormal Pap, myomata; (+) H/O 

ovarian cysts. Last Pap 1 1/2-2 years ago.


PMH: h/o asthma, onset age 13. Last attack 18 - used inhaler only. No h/o 

ICU admissions/intubations.


PSH: denies


Meds: Albuterol inhaler - PRN


Soc Hx: denies tobacco, illicit drug use. Soc EtOH use.  x 10 years. 

Works in a 's shop


Fam Hx: Mother alive 47 y.o. no med issues. Father  age 37 -lung 

disorder. Paternal aunt  - lung cancer; no h/o tobacco use.








Present on Admission





- Present on Admission


Any Indicators Present on Admission: No





Review of Systems





- Review of Systems


All systems: reviewed and no additional remarkable complaints except





- Genitourinary


Genitourinary: As Per HPI





Past Patient History





- Infectious Disease


Hx of Infectious Diseases: None





- Past Medical History & Family History


Past Medical History?: Yes


Past Family History: Reviewed and not pertinent





- Past Social History


Smoking Status: Never Smoked


Alcohol: Social


Home Situation {Lives}: With Family





- CARDIAC


Hx Cardiac Disorders: No





- PULMONARY


Hx Asthma: Yes





- NEUROLOGICAL


Hx Neurological Disorder: No





- HEENT


Hx HEENT Problems: No





- RENAL


Hx Chronic Kidney Disease: No





- ENDOCRINE/METABOLIC


Hx Endocrine Disorders: No





- HEMATOLOGICAL/ONCOLOGICAL


Hx Blood Disorders: No





- INTEGUMENTARY


Hx Dermatological Problems: No





- MUSCULOSKELETAL/RHEUMATOLOGICAL


Hx Musculoskeletal Disorders: No


Hx Falls: No





- GASTROINTESTINAL


Hx Gastrointestinal Disorders: No





- GENITOURINARY/GYNECOLOGICAL


Hx Genitourinary Disorders: No





- PSYCHIATRIC


Hx Substance Use: No





- SURGICAL HISTORY


Hx Surgeries: No





- ANESTHESIA


Hx Anesthesia: Yes


Hx Anesthesia Reactions: No





Meds


Allergies/Adverse Reactions: 


                                    Allergies











Allergy/AdvReac Type Severity Reaction Status Date / Time


 


No Known Allergies Allergy   Verified 18 19:00














Physical Exam





- Constitutional


Appears: No Acute Distress





- Head Exam


Head Exam: ATRAUMATIC, NORMAL INSPECTION, NORMOCEPHALIC





- ENT Exam


ENT Exam: Mucous Membranes Moist





- Neck Exam


Neck exam: Positive for: Full Rom





- Respiratory Exam


Respiratory Exam: NORMAL BREATHING PATTERN





- Cardiovascular Exam


Cardiovascular Exam: REGULAR RHYTHM





- GI/Abdominal Exam


GI & Abdominal Exam: Soft ((+) lower abdominal tenderness to palpation. No 

rebound tenderness)





-  Exam


Additional comments: 





(+) large blood clot on examination; (+)moderate vaginal bleeding noted. 

Cervical os open 1 cm. Uterus 10 weeks, anteverted, soft, mobile, mildly tender.

No appreciable adnexal masses or tenderness.





- Extremities Exam


Extremities exam: Positive for: full ROM, normal inspection





- Neurological Exam


Neurological exam: Alert, Oriented x3





- Psychiatric Exam


Psychiatric exam: Normal Affect, Normal Mood





- Skin


Skin Exam: Dry, Intact, Normal Color





Results





- Vital Signs


Recent Vital Signs: 





                                Last Vital Signs











Temp  98.6 F   18 01:20


 


Pulse  70   18 02:45


 


Resp  23   18 02:45


 


BP  140/62   18 02:45


 


Pulse Ox  100   18 03:05














- Labs


Result Diagrams: 


                                 18 01:42





                                 18 01:42


Labs: 





                         Laboratory Results - last 24 hr











  18





  01:42 01:42


 


WBC  9.3 


 


RBC  3.91 


 


Hgb  12.2 


 


Hct  35.7 


 


MCV  91.1 


 


MCH  31.2 H 


 


MCHC  34.2 


 


RDW  13.6 


 


Plt Count  206 


 


MPV  9.8 


 


Neut % (Auto)  55.6 


 


Lymph % (Auto)  35.0 


 


Mono % (Auto)  5.7 


 


Eos % (Auto)  3.4 


 


Baso % (Auto)  0.3 


 


Neut # (Auto)  5.2 


 


Lymph # (Auto)  3.2 


 


Mono # (Auto)  0.5 


 


Eos # (Auto)  0.3 


 


Baso # (Auto)  0.0 


 


Sodium   135


 


Potassium   3.4 L


 


Chloride   103


 


Carbon Dioxide   22


 


Anion Gap   13


 


BUN   8


 


Creatinine   0.6 L


 


Est GFR ( Amer)   > 60


 


Est GFR (Non-Af Amer)   > 60


 


Random Glucose   117 H


 


Calcium   9.0


 


Total Bilirubin   0.4


 


AST   46 H D


 


ALT   82 H D


 


Alkaline Phosphatase   86


 


Total Protein   7.4


 


Albumin   4.4


 


Globulin   3.0


 


Albumin/Globulin Ratio   1.5


 


Beta HCG, Quant   5966.60














Assessment & Plan





- Assessment and Plan (Free Text)


Assessment: 





28 y.o. , 12 weeks by LMP, presumed missed /blighted ovum, now 

with severe vaginal bleeding and open cervical os - incomplete spontaneous 

. Afebrile, vital signs stable. Consent obtained for suction/sharp 

curettage and possible blood transfusion. Patient is clinically stable. 


Plan: 





1) Admit to GYN


2) Maintain NPO


3) 20 units pitocin


4) Doxycycline 100 mg IVPB x 1 now


5) On bird to O.R.





- Date & Time


Date: 18


Time: 03:41

## 2018-12-07 NOTE — CP.PCM.DIS
Provider





- Provider


Date of Admission: 


18 03:06





Attending physician: 


Trisha Leos MD





Time Spent in preparation of Discharge (in minutes): 35





Diagnosis





- Discharge Diagnosis


(1) S/P dilation and curettage


Status: Acute   





Hospital Course





- Lab Results


Lab Results: 


                             Most Recent Lab Values











WBC  7.9 K/uL (4.8-10.8)   18  11:08    


 


RBC  2.90 Mil/uL (3.80-5.20)  L  18  11:08    


 


Hgb  9.0 g/dL (11.0-16.0)  L D 18  11:08    


 


Hct  26.4 % (34.0-47.0)  L  18  11:08    


 


MCV  91.2 fL (81.0-99.0)   18  11:08    


 


MCH  31.1 pg (27.0-31.0)  H  18  11:08    


 


MCHC  34.1 g/dL (33.0-37.0)   18  11:08    


 


RDW  13.4 % (11.5-14.5)   18  11:08    


 


Plt Count  153 K/uL (130-400)   18  11:08    


 


MPV  9.9 fL (7.2-11.7)   18  11:08    


 


Neut % (Auto)  63.7 % (50.0-75.0)   18  11:08    


 


Lymph % (Auto)  28.8 % (20.0-40.0)   18  11:08    


 


Mono % (Auto)  4.9 % (0.0-10.0)   18  11:08    


 


Eos % (Auto)  2.3 % (0.0-4.0)   18  11:08    


 


Baso % (Auto)  0.3 % (0.0-2.0)   18  11:08    


 


Neut # (Auto)  5.0 K/uL (1.8-7.0)   18  11:08    


 


Lymph # (Auto)  2.3 K/uL (1.0-4.3)   18  11:08    


 


Mono # (Auto)  0.4 K/uL (0.0-0.8)   18  11:08    


 


Eos # (Auto)  0.2 K/uL (0.0-0.7)   18  11:08    


 


Baso # (Auto)  0.0 K/uL (0.0-0.2)   18  11:08    


 


PT  12.8 SECONDS (9.7-12.2)  H  18  03:20    


 


INR  1.2   18  03:20    


 


APTT  28 SECONDS (21-34)   18  03:20    


 


Sodium  135 mmol/L (132-148)   18  01:42    


 


Potassium  3.4 mmol/L (3.6-5.2)  L  18  01:42    


 


Chloride  103 mmol/L ()   18  01:42    


 


Carbon Dioxide  22 mmol/L (22-30)   18  01:42    


 


Anion Gap  13  (10-20)   18  01:42    


 


BUN  8 mg/dL (7-17)   18  01:42    


 


Creatinine  0.6 mg/dL (0.7-1.2)  L  18  01:42    


 


Est GFR ( Amer)  > 60   18  01:42    


 


Est GFR (Non-Af Amer)  > 60   18  01:42    


 


POC Glucose (mg/dL)  99 mg/dL ()   18  03:21    


 


Random Glucose  117 mg/dL ()  H  18  01:42    


 


Calcium  9.0 mg/dl (8.6-10.4)   18  01:42    


 


Total Bilirubin  0.4 mg/dL (0.2-1.3)   18  01:42    


 


AST  46 U/L (14-36)  H D 18  01:42    


 


ALT  82 U/L (9-52)  H D 18  01:42    


 


Alkaline Phosphatase  86 U/L ()   18  01:42    


 


Total Protein  7.4 g/dL (6.3-8.3)   18  01:42    


 


Albumin  4.4 g/dL (3.5-5.0)   18  01:42    


 


Globulin  3.0 gm/dL (2.2-3.9)   18  01:42    


 


Albumin/Globulin Ratio  1.5  (1.0-2.1)   18  01:42    


 


Beta HCG, Quant  5966.60 mIU/ML  18  01:42    


 


Blood Type  O POSITIVE   18  03:03    


 


Antibody Screen  Negative   18  03:03    














- Hospital Course


Hospital Course: 





Pt is a 27 yo , LMP 9/15/18, who presented to the ED c/o onset of heavy 

vaginal bleeding at 2200 hours 18 and strong abdominal pain, which she 

described as "... like contractions when having a baby" onset 2300 hours. 

Quantitative HCG noted to have decreased from 13,362 on , now 5,966.6. 

Pelvic ultrasound from 18 shows (+) gestational sac c/w 6w 4d; no yolk sac;

no fetal pole; small subchorionic hemorrhage noted. Pt has not yet initiated 

prenatal care. Pt was received in [facility] in apparent pain - S/P morphine. Pt 

had a d/c performed by Dr Leos while admitted. Pt recovering well, vaginal 

bleeding is improving. Pt given 2 doses of IV Vibramycin. Pt advised to follow 

up with [facility]. Advised to return to emergency department if 

symptoms return or worsen. Pt seen and examined, ready to be discharged home.








- Date & Time of H&P


Date of H&P: 18


Time of H&P: 14:20





Discharge Exam





- Head Exam


Head Exam: ATRAUMATIC, NORMOCEPHALIC





- Eye Exam


Eye Exam: EOMI





- ENT Exam


ENT Exam: Mucous Membranes Moist





- Respiratory Exam


Respiratory Exam: Clear to PA & Lateral, NORMAL BREATHING PATTERN.  absent: 

Accessory Muscle Use, Wheezes, Respiratory Distress





- Cardiovascular Exam


Cardiovascular Exam: +S1, +S2.  absent: Diastolic murmur, JVD, Systolic Murmur





- GI/Abdominal Exam


GI & Abdominal Exam: Normal Bowel Sounds, Unremarkable





- Extremities Exam


Extremities exam: full ROM, pedal pulses present





- Neurological Exam


Neurological exam: Alert, Oriented x3





- Skin


Skin Exam: Dry, Intact, Warm





Discharge Plan





- Follow Up Plan


Condition: FAIR


Disposition: HOME/ ROUTINE


Additional Instructions: 


1. please discharge pt after second dose of doxycycline is given at 3:15pm today


2. please follow up with the [facility]


3. if your symptoms return or worsen, please go the nearest emergency department

as soon as possible

## 2018-12-07 NOTE — OP
PROCEDURE DATE:  2018



SURGEON:  Trisha Leos MD



ASSISTANT:  None applicable.



ANESTHESIOLOGIST:  Aubrey Sales MD



ANESTHESIA TYPE:  General LMA.



PREOPERATIVE DIAGNOSIS:  Incomplete  with hemorrhage.



POSTOPERATIVE DIAGNOSIS:  Incomplete  with hemorrhage.



OPERATIVE FINDINGS:  Anteverted uterus 12 weeks, soft and mobile, no

adnexal masses.  Uterus sounds to 12 cm and a moderate amount of products

of conception.



OPERATION PERFORMED:  Suction, sharp curettage.



SPECIMEN:  Products of conception.



ESTIMATED BLOOD LOSS:  50 mL.



INTRAVENOUS FLUIDS:  1000 mL _____.  The patient received 100 mg of

doxycycline en route to the operating room and 20 units of Pitocin in the

IV fluids.



BLOOD PRODUCTS:  None.



COMPLICATIONS:  None.



DESCRIPTION OF PROCEDURE:  The patient was taken to the operating room

after having obtained informed consent for the anticipated procedure.  This

included a discussion of possible complications, including, but not limited

to infection requiring continued antibiotics, uterine perforation requiring

laparoscopy, possible laparotomy, repair of any damage to internal organs,

removal of all the diseased tissue.  The patient had expressed concern, and

her questions were answered.  Consent forms were signed, dated, witnessed,

and placed in the chart.  In the operating room, the patient was placed on

the operating room table in a supine position where general anesthesia was

administered without incident.  She was repositioned into the dorsal

lithotomy position in candy-cane stirrups.  The vagina was prepped, and she

was subsequently draped in the usual sterile fashion.  Examination under

anesthesia was performed with the findings as above.  A weighted speculum

was placed in the posterior vaginal vault using a Lewis retractor.  The

cervix was visualized and was grasped on the anterior lip with a

single-tooth tenaculum.  The uterus was sounded with the findings as above.

A 10 cm suction curette was inserted without incident.  It was attached to

suction device which was activated.  The uterus was emptied of its

contents.  Sharp curettage was performed until a gritty texture was

obtained.  The repeat suction was performed.  The uterus was noted to be

contracted around the suction tip.  All instruments were removed.  Bimanual

massage was performed.  The uterus was contracted from approximately 10

weeks in size.  The patient was extubated, allowed to arise from

anesthesia, and transferred to the postanesthesia care unit in stable

condition.





__________________________________________

Trisha MD Deric





DD:  2018 4:31:26

DT:  2018 4:34:26

[facility] # [id number]

## 2018-12-07 NOTE — C.PDOC
History Of Present Illness


28 year old female presents to the ER with a complaint of heavy vaginal bleeding

and abdominal cramps that began tonight. Patient was seen here on 18 for 

pregnancy, no heart rate or fetal pole was found, she was diagnosed with missed 

 but did not follow up. Denies fever, chills, nausea, or vomiting.


Chief Complaint (Nursing): Female Genitourinary


History Per: Patient


History/Exam Limitations: no limitations


Onset/Duration Of Symptoms: Hrs


Current Symptoms Are (Timing): Still Present


Quality Of Discomfort: Cramping


Associated Symptoms: denies: Fever, Chills, Nausea, Vomiting


Alleviating Factors: None


Recent travel outside of the [place]: No


Abnormal Vaginal Bleeding: Yes





Past Medical History


Reviewed: Historical Data, Nursing Documentation, Vital Signs


Vital Signs: 





                                Last Vital Signs











Temp  98.6 F   18 01:20


 


Pulse  70   18 02:45


 


Resp  23   18 02:45


 


BP  140/62   18 02:45


 


Pulse Ox  100   18 02:45














- Medical History


PMH: Asthma


   Denies: Chronic Kidney Disease


Family History: States: Unknown Family Hx





- Social History


Hx Tobacco Use: No


Hx Alcohol Use: No


Hx Substance Use: No





- Immunization History


Hx Tetanus Toxoid Vaccination: Yes


Hx Influenza Vaccination: No


Hx Pneumococcal Vaccination: No





Review Of Systems


Constitutional: Negative for: Fever, Chills


Cardiovascular: Negative for: Chest Pain, Palpitations


Respiratory: Negative for: Cough, Shortness of Breath


Gastrointestinal: Positive for: Abdominal Pain (Cramping)


Genitourinary: Positive for: Vaginal Bleeding


Neurological: Negative for: Weakness, Numbness





Physical Exam





- Physical Exam


Appears: Non-toxic, Other (In discomfort)


Skin: Normal Color, Warm, Dry


Head: Atraumatic, Normacephalic


Eye(s): bilateral: Normal Inspection


Oral Mucosa: Moist


Chest: Symmetrical, No Tenderness


Cardiovascular: Rhythm Regular


Respiratory: Normal Breath Sounds, No Rales, No Rhonchi, No Wheezing


Gastrointestinal/Abdominal: Soft, No Tenderness


Neurological/Psych: Oriented x3, Normal Speech





ED Course And Treatment





- Laboratory Results


Result Diagrams: 


                                 18 01:42





                                 18 01:42


O2 Sat by Pulse Oximetry: 100 (Room air)


Pulse Ox Interpretation: Normal


Progress Note: Blood work and pelvis US ordered. IV fluids and morphine ad

ministered. OBGYN on call  was called for consult. She evaluated 

patient at bedside, accepted patient to her servive for OR and andmission.





Disposition





- Disposition


Disposition: HOSPITALIZED


Disposition Time: 03:25


Condition: FAIR





- Clinical Impression


Clinical Impression: 


 Incomplete , Vaginal bleeding








- PA / NP / Resident Statement


MD/DO has reviewed & agrees with the documentation as recorded.





- Scribe Statement


The provider has reviewed the documentation as recorded by the Scribe





Dustin Chavez





All medical record entries made by the Scribe were at my direction and 

personally dictated by me. I have reviewed the chart and agree that the record 

accurately reflects my personal performance of the history, physical exam, 

medical decision making, and the department course for this patient. I have also

personally directed, reviewed, and agree with the discharge instructions and 

disposition.





Decision To Admit





- Pt Status Changed To:


Hospital Disposition Of: Inpatient





- Admit Certification


Admit to Inpatient:: After my assessment, the patient will require 

hospitalization for at least two midnights.  This is because of the severity of 

symptoms shown, intensity of services needed, and/or the medical risk in this 

patient being treated as an outpatient.





- InPatient:


Physician Admission Certification: I certify that this patient requires 2 or 

more midnights of care for the following reason:: Patient is going to OR.





- .


Bed Request Type: OB/GYN


Admitting Physician: Trisha A Deric


Patient Diagnosis: 


 Incomplete , Vaginal bleeding